# Patient Record
Sex: FEMALE | URBAN - METROPOLITAN AREA
[De-identification: names, ages, dates, MRNs, and addresses within clinical notes are randomized per-mention and may not be internally consistent; named-entity substitution may affect disease eponyms.]

---

## 2020-11-09 ENCOUNTER — APPOINTMENT (RX ONLY)
Dept: URBAN - METROPOLITAN AREA CLINIC 158 | Facility: CLINIC | Age: 60
Setting detail: DERMATOLOGY
End: 2020-11-09

## 2020-11-09 DIAGNOSIS — L65.9 NONSCARRING HAIR LOSS, UNSPECIFIED: ICD-10-CM

## 2020-11-09 DIAGNOSIS — L91.8 OTHER HYPERTROPHIC DISORDERS OF THE SKIN: ICD-10-CM

## 2020-11-09 DIAGNOSIS — D22 MELANOCYTIC NEVI: ICD-10-CM

## 2020-11-09 DIAGNOSIS — I78.8 OTHER DISEASES OF CAPILLARIES: ICD-10-CM

## 2020-11-09 PROBLEM — D22.0 MELANOCYTIC NEVI OF LIP: Status: ACTIVE | Noted: 2020-11-09

## 2020-11-09 PROBLEM — D22.39 MELANOCYTIC NEVI OF OTHER PARTS OF FACE: Status: ACTIVE | Noted: 2020-11-09

## 2020-11-09 PROCEDURE — 99202 OFFICE O/P NEW SF 15 MIN: CPT

## 2020-11-09 PROCEDURE — ? PATIENT SPECIFIC COUNSELING

## 2020-11-09 PROCEDURE — ? COUNSELING

## 2020-11-09 ASSESSMENT — LOCATION SIMPLE DESCRIPTION DERM
LOCATION SIMPLE: LEFT FOREHEAD
LOCATION SIMPLE: LEFT EYEBROW
LOCATION SIMPLE: RIGHT UPPER BACK
LOCATION SIMPLE: RIGHT FOREHEAD
LOCATION SIMPLE: LEFT LIP
LOCATION SIMPLE: SCALP

## 2020-11-09 ASSESSMENT — LOCATION ZONE DERM
LOCATION ZONE: TRUNK
LOCATION ZONE: SCALP
LOCATION ZONE: LIP
LOCATION ZONE: FACE

## 2020-11-09 ASSESSMENT — LOCATION DETAILED DESCRIPTION DERM
LOCATION DETAILED: LEFT MEDIAL FOREHEAD
LOCATION DETAILED: LEFT LATERAL EYEBROW
LOCATION DETAILED: LEFT INFERIOR FOREHEAD
LOCATION DETAILED: RIGHT MEDIAL UPPER BACK
LOCATION DETAILED: LEFT SUPERIOR PARIETAL SCALP
LOCATION DETAILED: LEFT LOWER CUTANEOUS LIP
LOCATION DETAILED: RIGHT FOREHEAD

## 2020-11-09 NOTE — PROCEDURE: PATIENT SPECIFIC COUNSELING
Detail Level: Zone
Patient has subjective hair loss that is not appreciable on examination.  There are no obvious areas of alopecia.  I discussed with patient that there are numerous factors that can lead to thinning hair including genetic, age, and in women, being in postmenopause.  Other common causes hair loss can include hypothyroidism and anemia.  Malnutrition from lack of protein and trace vitamins and minerals can also lead to hair loss but this is very uncommon in [place] given a normal diet.  Medications can also cause hair loss.  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 I discussed with patient that there are limited treatment options that we have for thinning hair.  \\nI discussed with patient that there are limited treatment option for AGA.  The only FDA approved medical (non-surgical or non-laser)  treatments available for AGA in female at this time is topical Rogaine (monoxidil).  Rogaine has shown efficacy to slow down progression of AGA in clinical trial and be obtain OTC.  Alternative hair loss treatments beyond  Rogaine include the Capillus laser cap, PRP injection and hair transplant.

## 2025-03-13 NOTE — HPI: SKIN LESIONS
To [facility]    No protocol for requested medication.    Medication:    Disp Refills Start End    HYDROcodone-acetaminophen (NORCO)  MG per tablet 30 tablet 0 2/19/2025 --    Sig - Route: Take 1 tablet by mouth in the morning and 1 tablet in the evening. - Oral    Sent to pharmacy as: HYDROcodone-Acetaminophen  MG Oral Tablet (NORCO)      Last office visit date: 2.18.25    Pharmacy: [facility] #[id number] [place] [address] RD AT [facility] [address]    Order pended, routed to clinician for review.     
Have Your Skin Lesions Been Treated?: not been treated
Is This A New Presentation, Or A Follow-Up?: Skin Lesion
How Severe Is Your Skin Lesion?: mild